# Patient Record
Sex: MALE | URBAN - METROPOLITAN AREA
[De-identification: names, ages, dates, MRNs, and addresses within clinical notes are randomized per-mention and may not be internally consistent; named-entity substitution may affect disease eponyms.]

---

## 2022-03-10 ENCOUNTER — TELEPHONE (OUTPATIENT)
Dept: PSYCHIATRY | Facility: CLINIC | Age: 27
End: 2022-03-10

## 2022-03-10 NOTE — TELEPHONE ENCOUNTER
Pt mom called said current provider is leaving practice  Informed pt mom of wait list, pt mom would like pt to be added to wait list  PT mom stated she was told [facility] does not do mental health service any more but would still like to be on the wait list  PT has autism